# Patient Record
Sex: FEMALE | Race: WHITE
[De-identification: names, ages, dates, MRNs, and addresses within clinical notes are randomized per-mention and may not be internally consistent; named-entity substitution may affect disease eponyms.]

---

## 2020-09-09 ENCOUNTER — HOSPITAL ENCOUNTER (EMERGENCY)
Dept: HOSPITAL 35 - ER | Age: 40
Discharge: HOME | End: 2020-09-09
Payer: COMMERCIAL

## 2020-09-09 VITALS — BODY MASS INDEX: 36.8 KG/M2 | HEIGHT: 62 IN | WEIGHT: 200 LBS

## 2020-09-09 VITALS — SYSTOLIC BLOOD PRESSURE: 178 MMHG | DIASTOLIC BLOOD PRESSURE: 118 MMHG

## 2020-09-09 DIAGNOSIS — R19.7: ICD-10-CM

## 2020-09-09 DIAGNOSIS — F17.210: ICD-10-CM

## 2020-09-09 DIAGNOSIS — R05: ICD-10-CM

## 2020-09-09 DIAGNOSIS — R51: ICD-10-CM

## 2020-09-09 DIAGNOSIS — J02.9: Primary | ICD-10-CM

## 2020-09-09 DIAGNOSIS — R43.8: ICD-10-CM

## 2020-09-09 DIAGNOSIS — Z20.828: ICD-10-CM

## 2021-05-29 ENCOUNTER — HOSPITAL ENCOUNTER (EMERGENCY)
Dept: HOSPITAL 35 - ER | Age: 41
Discharge: HOME | End: 2021-05-29
Payer: COMMERCIAL

## 2021-05-29 VITALS — SYSTOLIC BLOOD PRESSURE: 140 MMHG | DIASTOLIC BLOOD PRESSURE: 84 MMHG

## 2021-05-29 VITALS — WEIGHT: 230.01 LBS | BODY MASS INDEX: 42.33 KG/M2 | HEIGHT: 62 IN

## 2021-05-29 DIAGNOSIS — Z90.49: ICD-10-CM

## 2021-05-29 DIAGNOSIS — R07.89: Primary | ICD-10-CM

## 2021-05-29 DIAGNOSIS — Z88.0: ICD-10-CM

## 2021-05-29 DIAGNOSIS — R03.0: ICD-10-CM

## 2021-05-29 DIAGNOSIS — J45.909: ICD-10-CM

## 2021-05-29 DIAGNOSIS — F17.210: ICD-10-CM

## 2021-05-29 DIAGNOSIS — R25.2: ICD-10-CM

## 2021-05-29 LAB
ALBUMIN SERPL-MCNC: 3.1 G/DL (ref 3.4–5)
ALT SERPL-CCNC: 99 U/L (ref 14–59)
ANION GAP SERPL CALC-SCNC: 9 MMOL/L (ref 7–16)
AST SERPL-CCNC: 85 U/L (ref 15–37)
BASOPHILS NFR BLD AUTO: 0.8 % (ref 0–2)
BILIRUB SERPL-MCNC: 0.2 MG/DL (ref 0.2–1)
BUN SERPL-MCNC: 10 MG/DL (ref 7–18)
CALCIUM SERPL-MCNC: 9.4 MG/DL (ref 8.5–10.1)
CHLORIDE SERPL-SCNC: 104 MMOL/L (ref 98–107)
CO2 SERPL-SCNC: 29 MMOL/L (ref 21–32)
CREAT SERPL-MCNC: 0.9 MG/DL (ref 0.6–1)
EOSINOPHIL NFR BLD: 1.8 % (ref 0–3)
ERYTHROCYTE [DISTWIDTH] IN BLOOD BY AUTOMATED COUNT: 15.1 % (ref 10.5–14.5)
GLUCOSE SERPL-MCNC: 102 MG/DL (ref 74–106)
GRANULOCYTES NFR BLD MANUAL: 64 % (ref 36–66)
HCT VFR BLD CALC: 48.3 % (ref 37–47)
HGB BLD-MCNC: 16.6 GM/DL (ref 12–15)
LIPASE: 177 U/L (ref 73–393)
LYMPHOCYTES NFR BLD AUTO: 22.5 % (ref 24–44)
MCH RBC QN AUTO: 34.9 PG (ref 26–34)
MCHC RBC AUTO-ENTMCNC: 34.3 G/DL (ref 28–37)
MCV RBC: 101.8 FL (ref 80–100)
MONOCYTES NFR BLD: 10.9 % (ref 1–8)
NEUTROPHILS # BLD: 6.4 THOU/UL (ref 1.4–8.2)
PLATELET # BLD: 302 THOU/UL (ref 150–400)
POTASSIUM SERPL-SCNC: 4.1 MMOL/L (ref 3.5–5.1)
PROT SERPL-MCNC: 7.6 G/DL (ref 6.4–8.2)
RBC # BLD AUTO: 4.75 MIL/UL (ref 4.2–5)
SODIUM SERPL-SCNC: 142 MMOL/L (ref 136–145)
TROPONIN I SERPL-MCNC: <0.06 NG/ML (ref ?–0.06)
WBC # BLD AUTO: 10 THOU/UL (ref 4–11)

## 2021-05-30 NOTE — EKG
[address]  [address]
Phone:  [phone]                    ELECTROCARDIOGRAM REPORT      
_______________________________________________________________________________
 
Name:       NAZANIN LAO                    [facility]                     [id number]     Account #:      [id number]  
Admission:  21    Attend Phys:                          
Discharge:  21    Date of Birth:  [birth year]  
                                                          Report #: [id number]
   [id number]
_______________________________________________________________________________
                         [facility]
                                       
Test Date:    2021               Test Time:    18:06:32
Pat Name:     NAZANIN LAO                 Department:   
Patient ID:   [id number]            Room:          
Gender:       F                        Technician:   NICOL
:          1980               Requested By: Katie Solis
Order Number: [id number] MD:   Rick Guerrero
                                 Measurements
Intervals                              Axis          
Rate:         91                       P:            41
OR:           133                      QRS:          51
QRSD:         79                       T:            50
QT:           397                                    
QTc:          489                                    
                           Interpretive Statements
Sinus rhythm
Probable left atrial enlargement
Compared to ECG 2011 05:52:59
Poor R-wave progression no longer present
Electronically Signed On 2021 13:13:16 CDT by Rick Guerrero
[email or web address]
 
 
 
 
 
 
 
 
 
 
 
 
 
 
 
 
 
 
 
 
 
  <ELECTRONICALLY SIGNED>
   By: Rick Guerrero MD        
  21     1313
D: 21 180                           _____________________________________
T: 21                           MD ROSS Shearer